# Patient Record
Sex: MALE | Race: OTHER | NOT HISPANIC OR LATINO | URBAN - METROPOLITAN AREA
[De-identification: names, ages, dates, MRNs, and addresses within clinical notes are randomized per-mention and may not be internally consistent; named-entity substitution may affect disease eponyms.]

---

## 2024-11-29 ENCOUNTER — OFFICE VISIT (OUTPATIENT)
Dept: URGENT CARE | Age: 29
End: 2024-11-29
Payer: COMMERCIAL

## 2024-11-29 VITALS
SYSTOLIC BLOOD PRESSURE: 126 MMHG | TEMPERATURE: 98.6 F | OXYGEN SATURATION: 97 % | DIASTOLIC BLOOD PRESSURE: 76 MMHG | HEART RATE: 93 BPM | RESPIRATION RATE: 18 BRPM

## 2024-11-29 DIAGNOSIS — L03.116 CELLULITIS OF LEFT LOWER LEG: Primary | ICD-10-CM

## 2024-11-29 RX ORDER — PROPRANOLOL HYDROCHLORIDE 20 MG/1
1 TABLET ORAL
COMMUNITY
Start: 2024-10-12

## 2024-11-29 RX ORDER — CLINDAMYCIN HYDROCHLORIDE 300 MG/1
300 CAPSULE ORAL 3 TIMES DAILY
Qty: 30 CAPSULE | Refills: 0 | Status: SHIPPED | OUTPATIENT
Start: 2024-11-29 | End: 2024-12-09

## 2024-11-29 RX ORDER — GABAPENTIN 250 MG/5ML
SOLUTION ORAL
COMMUNITY

## 2024-11-29 RX ORDER — GUANFACINE 4 MG/1
1 TABLET, EXTENDED RELEASE ORAL
COMMUNITY
Start: 2024-10-28

## 2024-11-29 RX ORDER — LISDEXAMFETAMINE DIMESYLATE 70 MG/1
1 CAPSULE ORAL
COMMUNITY
Start: 2024-11-10

## 2024-11-29 RX ORDER — BUPROPION HYDROCHLORIDE 150 MG/1
1 TABLET, EXTENDED RELEASE ORAL
COMMUNITY
Start: 2024-11-26

## 2024-11-29 NOTE — PROGRESS NOTES
Subjective   Patient ID: Jefe Rock is a 29 y.o. male. They present today with a chief complaint of Insect Bite (Left ankle looks like her got bit by insect. Swollen for 2 days. No pain but swollen/ discomfort. Hot to touch).    History of Present Illness  HPI  Patient presents to the urgent care is a 29-year-old male with 2-3-day history of left lower leg swelling, redness, pain.  Patient believes he was bit by an insect, on his lateral ankle.  Patient reports the pain swelling and redness in his foot was worse yesterday, has improved today, however, the redness has continued to travel up his leg.    Past Medical History  Allergies as of 11/29/2024    (No Known Allergies)       (Not in a hospital admission)       Past Medical History:   Diagnosis Date    Pain in unspecified shoulder     Shoulder pain       No past surgical history on file.     reports that he has never smoked. He has never used smokeless tobacco. He reports that he does not drink alcohol.    Review of Systems  Review of Systems     Patient denies drainage, numbness, tingling, changes in ROM, weakness, sore throat, fever, rash, dizziness, shortness of breath, increased urinary frequency, chills, abdominal or chest pain.                          Objective    Vitals:    11/29/24 1030   BP: 126/76   Pulse: 93   Resp: 18   Temp: 37 °C (98.6 °F)   SpO2: 97%     No LMP for male patient.    Physical Exam  Appearance: Alert, oriented, cooperative, in no acute distress. Well nourished & well hydrated.    Skin: no petechiae or purpura.     Eyes: Conjunctiva pink with no redness or exudates. Eyelids without lesions. No scleral icterus.     ENT: Hearing grossly intact. External inspection of ears without lesions or erythema. no nasal flaring. no tripoding, no drooling, no difficulty swallowing oral secretions.    Pulmonary:  Good chest wall excursion. No accessory muscle use or stridor.    Cardiac:  No JVD.     Musculoskeletal: no deformity. No cyanosis,  clubbing. sensation 5/5 distal to rash, cap. fill < 2 sec distal to rash, distal half of left lower extremity erythematous, greatest superior to lateral malleolus, mild erythema of proximal foot, no edema, erythema in distal foot, area was outlined with blue marker    Neurological: no focal findings identified.    Psychiatric: Appropriate mood and affect.    Procedures    Point of Care Test & Imaging Results from this visit  No results found for this visit on 11/29/24.   No results found.    Diagnostic study results (if any) were reviewed by Gini Payne PA-C.    Assessment/Plan   Allergies, medications, history, and pertinent labs/EKGs/Imaging reviewed by Gini Payne PA-C.     Medical Decision Making  Considerations for patient's symptoms include urticaria, cellulitis/erysipelas, insect bite, erythema chronicum migrans. No signs of systemic illness or infection at this time, VSS, patient is afebrile, denies chills, fevers, nausea, malaise, and hx of trauma. Given the onset, progression and physical exam, cellulitis is considered the most likely diagnosis at this time. Area of erythema/edema outlined with purple marker, patient was advised within 24 hours erythema should not be extending past the marked line, and should be decreasing within 48 hours. Patient will begin taking antibiotic, clindamycin and monitor progression of infection. If symptoms are not improving or if they are worsening the patient will call their primary care physician and go to the ER. Patient verbalizes understanding and agrees with plan of care.  All questions were answered.    Dictation software was used in the creation of this note which does not evaluate or correct for typographical, spelling, syntax or grammatical errors.    Orders and Diagnoses  There are no diagnoses linked to this encounter.    Medical Admin Record      Patient disposition: Home    Electronically signed by Gini Payne PA-C  11:04 AM

## 2024-11-29 NOTE — PATIENT INSTRUCTIONS
Cellulitis    What is the treatment for cellulitis?  Cellulitis is a skin infection. The edge of the involved area of swelling should be marked to monitor progression/regression of the infection.     Home Care:  1. Take Tylenol or ibuprofen as needed for pain.  2. Keep the area clean and dry, warm compresses can be used if it helps alleviate pain.   3. Take the antibiotics as prescribed.  4. Evaluate for signs of worsening infection daily, such as increased pain, redness, swelling, or drainage.  5. Follow up with your primary care physician or wound care, see contact info below.   6. Go to the ER if worse in 24 hours or not improved in 48 hours.  7. Avoid using alcohol or peroxide as these chemical can inhibit wound healing.    See your doctor for a wound recheck if there is any question of infection or if not better.     Call your doctor or go to the emergency department if worse or:  1. The wound opens or bleeds   2. Increased wound redness, swelling, or pus drainage occurs.   3. Fever occurs.   4. Pain worsens or does not improve.